# Patient Record
(demographics unavailable — no encounter records)

---

## 2025-06-23 NOTE — PHYSICAL EXAM
[de-identified] : Right ankle Physical Examination:  General: Alert and oriented x3.  In no acute distress.  Pleasant in nature with a normal affect.  No apparent respiratory distress.  Erythema, Warmth, Rubor: Negative Swelling: Negative  ROM: 1. Dorsiflexion: 10 degrees 2. Plantarflexion: 40 degrees 3. Inversion: 30 degrees 4. Eversion: 20 degrees 5. Subtalar: 10 degrees  Tenderness to Palpation:  1. Lateral Malleolus: Negative 2. Medial Malleolus: Negative 3. Proximal Fibular Pain: Negative 4. Heel Pain: Negative 5. Cuboid: Negative 6. Navicular: Negative 7. Tibiotalar Joint: Negative 8. Subtalar Joint: Negative 9. Posterior Recess: Negative  Tendon Pain: 1. Achilles: Negative 2. Peroneals: Negative 3. Posterior Tibialis: Negative 4. Tibialis Anterior: Negative  Ligament Pain: 1. ATFL: Positive 2. CFL: Negative  3. PTFL: Negative 4. Deltoid Ligaments: Negative 5. Lis Franc Ligament: Negative  Stability:  1. Anterior Drawer: Negative 2. Posterior Drawer: Negative  Strength: 5/5 TA/GS/EHL  Pulses: 2+ DP/PT Pulses  Neuro: Intact motor and sensory  Additional Test: 1. Calcaneal Squeeze Test: Negative 2. Syndesmosis Squeeze Test: Negative [de-identified] : Radiology imaging reviewed in office with the patient on 2025 and I agree with the radiologist's impression below.  Office Location: [address] [place] [address], [facility] Office Phone: [phone] Office Fax: [phone] Patient Name: Andre Kerns Patient Phone Number: [phone] Patient ID: [id number] : 1993 Date of Exam: 2025 R. Phys. Name: Hira Aquino R. Phys. Address: [address] [place] [address], [facility] R. Phys. Phone: [phone] EXAM: MRI-RIGHT ANKLE NON CONTRAST  HISTORY: Right ankle pain.  COMPARISON: No prior studies are available for comparison.  TECHNIQUE: MR imaging of the right ankle was performed without IV contrast on a 3.0 Jillian Ultra High Field Wide Bore MRI unit utilizing the following pulse sequences: Axial proton density with and without fat suppression, sagittal proton density and STIR, coronal proton density.  FINDINGS: Bones/Joints: There is no acute fracture or osteonecrosis. The talar dome is maintained without evidence for osteochondral injury. The subtalar, calcaneocuboid, and talonavicular joints are intact. No significant joint effusion is identified. Fat within the sinus tarsi is clear.  Ligaments: The anterior and posterior tibiofibular ligaments are intact. Severe anterior talofibular ligament sprain and high-grade tear is seen at the fibular attachment. There is severe calcaneofibular ligament sprain/partial tear.  Tendons/Muscles: Peroneal tendinosis is seen in association with interstitial fissuring of the retromalleolar peroneus longus tendon with a short-segment intrasubstance split tear inferior to the malleolus. No high-grade muscle atrophy or acute muscle tear is present.  Plantar Fascia: The plantar fascia is intact.  Miscellaneous: Lateral ankle soft tissue edema is likely reactive. There is no space occupying mass within the tarsal tunnel.  IMPRESSION:    Severe anterior talofibular and calcaneofibular ligament sprain/tear with overlying soft tissue edema. Peroneal tendinosis with partial tear of the peroneus longus tendon. Recommend follow-up to resolution.   Electronically Signed By: Karyn Jimenes M.D., on 2025 4:47 PM   SIGNED BY: Karyn Jimenes M.D., Ext. [phone] 04:47 PM

## 2025-06-23 NOTE — HISTORY OF PRESENT ILLNESS
[FreeTextEntry1] : The patient is a 32-year-old male who presents with his mom today in the office for evaluation of his right ankle.  The patient was sent here by his podiatrist Dr. Hira Aquino for an opinion in regards to his right ankle.  The patient has an MRI ordered by Dr. Hira Aquino from [facility] performed on 6/11/2025.  The patient presents wearing a short cam walking boot.  His pain is controlled in office today.  He presents with his mom today in the office.  No other complaints.

## 2025-06-23 NOTE — DISCUSSION/SUMMARY
[de-identified] : MRI reviewed with him and his mom and all questions answered about the MRI findings.  Based on the patient's physical examination and MRI findings, I want a start the patient in physical therapy for strength and conditioning program following right ankle sprain protocol.  With the help from his physical therapist, he can discontinue the boot and go into his brace which she has.  Continue with RICE therapy for swelling control.  Continue with over-the-counter Tylenol as needed, as directed, as tolerated for pain.  To be modifications until he feels better.  No invasive treatments are warranted.  Follow-up as needed.